# Patient Record
Sex: FEMALE | Race: BLACK OR AFRICAN AMERICAN | NOT HISPANIC OR LATINO | Employment: OTHER
[De-identification: names, ages, dates, MRNs, and addresses within clinical notes are randomized per-mention and may not be internally consistent; named-entity substitution may affect disease eponyms.]

---

## 2023-10-22 ENCOUNTER — HEALTH MAINTENANCE LETTER (OUTPATIENT)
Age: 73
End: 2023-10-22

## 2023-12-01 ASSESSMENT — SLEEP AND FATIGUE QUESTIONNAIRES
HOW LIKELY ARE YOU TO NOD OFF OR FALL ASLEEP IN A CAR, WHILE STOPPED FOR A FEW MINUTES IN TRAFFIC: WOULD NEVER DOZE
HOW LIKELY ARE YOU TO NOD OFF OR FALL ASLEEP WHILE SITTING INACTIVE IN A PUBLIC PLACE: WOULD NEVER DOZE
HOW LIKELY ARE YOU TO NOD OFF OR FALL ASLEEP WHILE LYING DOWN TO REST IN THE AFTERNOON WHEN CIRCUMSTANCES PERMIT: MODERATE CHANCE OF DOZING
HOW LIKELY ARE YOU TO NOD OFF OR FALL ASLEEP WHILE WATCHING TV: SLIGHT CHANCE OF DOZING
HOW LIKELY ARE YOU TO NOD OFF OR FALL ASLEEP WHEN YOU ARE A PASSENGER IN A CAR FOR AN HOUR WITHOUT A BREAK: SLIGHT CHANCE OF DOZING
HOW LIKELY ARE YOU TO NOD OFF OR FALL ASLEEP WHILE SITTING AND READING: SLIGHT CHANCE OF DOZING
HOW LIKELY ARE YOU TO NOD OFF OR FALL ASLEEP WHILE SITTING AND TALKING TO SOMEONE: WOULD NEVER DOZE
HOW LIKELY ARE YOU TO NOD OFF OR FALL ASLEEP WHILE SITTING QUIETLY AFTER LUNCH WITHOUT ALCOHOL: WOULD NEVER DOZE

## 2023-12-01 NOTE — PROGRESS NOTES
Patient declined visit due to insurance coverage.  Roberto Padgett RD, LD  [facility], [place]

## 2023-12-01 NOTE — PROGRESS NOTES
"New Medical Weight Management Consult    PATIENT:  Huyen Sarabia   MRN:         [id number]   :         1950  SILVIA:         2023      Dear Physician No Ref-Primary,    I had the pleasure of seeing your patient, Huyen Sarabia. Full intake/assessment was done to determine barriers to weight loss success and develop a treatment plan. Huyen Sarabia is a 73 year old female interested in treatment of medical problems associated with excess weight. She has a height of 5' 8\", a weight of 268 lbs 0 oz, and the calculated Body mass index is 40.75 kg/m .    Assessment & Plan   Problem List Items Addressed This Visit       Hypertension     Discussed in clinic visit. Anticipate improvement with weight loss.           Relevant Medications    lisinopril-hydrochlorothiazide (ZESTORETIC) 20-25 MG tablet    Prediabetes     Discussed in clinic visit. Anticipate improvement with weight loss.           Class 3 severe obesity due to excess calories with serious comorbidity and body mass index (BMI) of 40.0 to 44.9 in adult (H) - Primary     Initial MWM. Added vitamin D and recheck hgA1c to baseline labs from this summer. Dietitian not covered. Pt reports having multiple secondary insurances so may have GLP-1a coverage. GLP-1a first choice, metformin discussed as back up to address preDM. Also referred to MTM pharmD in 1-2 months for med/dose check. Will wait to send scripts once seeing newest labs in case crosses into diabetes from preDM for insurance coverage/availability.         Relevant Orders    Adult Sleep Eval & Management Referral    Hemoglobin A1c [LAB90] (Future)    Vitamin D Deficiency [NKH153] (Future)    Med Therapy Management Referral     Other Visit Diagnoses       Snoring        Relevant Orders    Adult Sleep Eval & Management Referral             PROGRAM OVERVIEW  Reviewed options at [facility].   All questions were answered. Education provided on chronic disease management of " "obesity.    SURGICAL WEIGHT LOSS   Option presented given pt BMI and current comorbid conditions. No current interest.  Website for bariatric online information session provided.  Pt will review at home and we will discuss at our follow up visit. [email or web address]     MEDICATIONS:  We discussed healthy habits to assist with weight loss. We reviewed medications associated with weight gain. We discussed the role of pharmacological agents in the treatment of obesity and the \"off-label\" use of medications in this practice. We reviewed medication that may assist with weight loss. Indications, contraindications, risks/benefits, and potential side effects were discussed.       GLP-1a was discussed. Discussed mechanism of action, common side effects, titration guidelines, and monitoring for pancreatitis/gallbladder problems/low sugars/MTC/MEN2. Plan to try for GLP-1a after labs with Metfomrin discussed in detail as back up option.    Discussed that medications must always be used together with lifestyle changes such as improvements in diet choices, portion control and establishing and maintaining a regular exercise program. Reviewed rationale for long term use of pharmacotherapy in chronic disease management for obesity.      AOM Considerations:  Phentermine:  Avoid w/age  Topiramate: Caution with age, category D with baby ASA and tramodol, CNS depression w/topiramate, diuretic consdieration with zestoretic  GLP-1: monitor sugars/synthroid response - plan A to try for pending labs   Naltrexone: unable to use w/tramadol    Wellbutrin: monitor response to cymbalta (prescribed by neurology) and tramadol  Metformin: monitor labs/hydration with zestoretic, monitor sugars, plan B - back up option   Contrave: avoid d/t tramadol  Qsymia: Caution with age                8/17/23 eGFR 61, Cr 0.97 WNL    PATIENT INSTRUCTIONS:  Plan:  Labs ordered.  Please call [phone] to set up " "a lab appt.   We will try for either an injectable medication or Metformin once labs are back. We will connect on [employer] once I see labs about prescriptions.  We will repeat blood work in a couple months after starting medications.  Referred to Sleep clinic and Medication Therapy pharmacist.     Goals:  I recommend eating breakfast within an hour of waking up and eating every 4-6 hours during the day to keep your metabolism up. Prioritizing veggies and proteins for food choices is also recommended as medically appropriate.  Recommend 64oz water daily as medically appropriate (6-8 glasses)  Try to cut out sugary drinks   Goal: doing exercises with your  three times a week.     FOLLOW-UP:    Please call [phone] to schedule your next visit in 3 months.    55 minutes spent on the date of the encounter doing chart review, history and exam, review test results, counseling, developing plan of care, documentation, and further activities as noted above.      She has the following co-morbidities:        12/1/2023     2:15 PM   --   I have the following health issues associated with obesity High Blood Pressure    High Cholesterol   I have the following symptoms associated with obesity Knee Pain    Lower Extremity Swelling    Back Pain    Hip Pain     Needs to lose weight as main goal - primary care discussed doing an injection but referred to our clinic due to other medical conditions to consider.        12/1/2023     2:15 PM   Referring Provider   Please name the provider who referred you to Medical Weight Management  If you do not know, please answer \"I Don't Know\" Dr. Monaco           12/1/2023     2:15 PM   Weight History   How concerned are you about your weight? Very Concerned   I became overweight As an Adult   The following factors have contributed to my weight gain Change in Schedule    A Health Crisis    Eating Wrong Types of Food    Eating Too Much    Lack of Exercise    Genetic (Runs in the Family) "   I have tried the following methods to lose weight Watching Portions or Calories    Weight Watchers    Slim Fast or Other Liquid Diets   My lowest weight since age 17 was 178   My highest weight since age 17 was 302   The most weight I have ever lost was (lbs) 35   I have the following family history of obesity/being overweight My mother is overweight    One or more of my siblings are overweight    Many of my relatives are overweight   How has your weight changed over the last year? Gained   How many pounds? 20           12/1/2023     2:15 PM   Diet Recall Review with Patient   If you do eat breakfast, what types of food do you eat? Eggs. Bender. Spinach, crackers   If you do eat lunch, what types of food do you typically eat? Salad, sandwich, starch   If you do eat supper, what types of food do you typically eat? Poultry, veggies, carbs   If you do snack, what types of food do you typically eat? Chips. Popcorn. Ice cream, fruit   How many glasses of juice do you drink in a typical day? 0   How many of glasses of milk do you drink in a typical day? 0   How many 8oz glasses of sugar containing drinks such as [employer]-A[employer]/sweet tea do you drink in a day? 1   How many cans/bottles of sugar pop/soda/tea/sports drinks do you drink in a day? 0   How many cans/bottles of diet pop/soda/tea or sports drink do you drink in a day? 0   How often do you have a drink of alcohol? Never     Meals:  Typically eats three meals but sometimes skips meals but not often.     Tends to be a snacker - more a habit. Also eats short/quick meals due to caring for .     Water: 3-4 glasses a day    Discussed sugary drink - sweet tea person        12/1/2023     2:15 PM   Eating Habits   Generally, my meals include foods like these bread, pasta, rice, potatoes, corn, crackers, sweet dessert, pop, or juice Almost Everyday   Generally, my meals include foods like these fried meats, brats, burgers, french fries, pizza, cheese, chips, or ice cream  A Few Times a Week   Eat fast food (like McDonalds, [employer], Taco Bell) Once a Week   Eat at a buffet or sit-down restaurant Less Than Weekly   Eat most of my meals in front of the TV or computer Almost Everyday   Often skip meals, eat at random times, have no regular eating times Almost Everyday   Rarely sit down for a meal but snack or graze throughout Almost Everyday   Eat extra snacks between meals Almost Everyday   Eat most of my food at the end of the day A Few Times a Week   Eat in the middle of the night or wake up at night to eat Once a Week   Eat extra snacks to prevent or correct low blood sugar Never   Eat to prevent acid reflux or stomach pain Less Than Weekly   Worry about not having enough food to eat Never   I eat when I am depressed Less Than Weekly   I eat when I am stressed Less Than Weekly   I eat when I am bored A Few Times a Week   I eat when I am anxious Once a Week   I eat when I am happy or as a reward Less Than Weekly   I feel hungry all the time even if I just have eaten Never   Feeling full is important to me Never   I finish all the food on my plate even if I am already full Almost Everyday   I can't resist eating delicious food or walk past the good food/smell Once a Week   I eat/snack without noticing that I am eating Less Than Weekly   I eat when I am preparing the meal Once a Week   I eat more than usual when I see others eating Once a Week   I have trouble not eating sweets, ice cream, cookies, or chips if they are around the house Almost Everyday   I think about food all day Less Than Weekly   What foods, if any, do you crave? Sweets/Candy/Chocolate           12/1/2023     2:15 PM   Amount of Food   I feel out of control when eating Never   I eat a large amount of food, like a loaf of bread, a box of cookies, a pint/quart of ice cream, all at once Never   I eat a large amount of food even when I am not hungry Monthly   I eat rapidly Monthly   I eat alone because I feel  embarrassed and do not want others to see how much I have eaten Never   I eat until I am uncomfortably full Never   I feel bad, disgusted, or guilty after I overeat Monthly      Denies intentionally vomiting after eating/using diuretics/laxatives or other purging type activities            12/1/2023     2:15 PM   Activity/Exercise History   How much of a typical 12 hour day do you spend sitting? Most of the Day   How much of a typical 12 hour day do you spend lying down? Less Than Half the Day   How much of a typical day do you spend walking/standing? Less Than Half the Day   How many hours (not including work) do you spend on the TV/Video Games/Computer/Tablet/Phone? 2-3 Hours   How many times a week are you active for the purpose of exercise? Never   What keeps you from being more active? Pain   How many total minutes do you spend doing some activity for the purpose of exercising when you exercise? 15-30 Minutes     Get moving referral?  Knows exercises she could do and will do exercises with .    PAST MEDICAL HISTORY:  Past Medical History:   Diagnosis Date    Arthritis 2002    Hypertension 2000    Thyroid disease 2002 12/1/2023     2:15 PM   Work/Social History Reviewed With Patient   My employment status is Retired    Disabled   What is your marital status? /In a Relationship   If in a relationship, is your significant other overweight? No   If you have children, are they overweight? Yes   Who do you live with? My  and daughter   Who does the food shopping? Me and my daughter       Social History     Tobacco Use    Smoking status: Never    Smokeless tobacco: Never   Substance Use Topics    Alcohol use: Never    Drug use: Never            12/1/2023     2:15 PM   Mental Health History Reviewed With Patient   Have you ever been physically or sexually abused? No   How often in the past 2 weeks have you felt little interest or pleasure in doing things? Not at all   Over the past 2  weeks how often have you felt down, depressed, or hopeless? Not at all           12/1/2023     2:15 PM   Questions Reviewed With Patient   How ready are you to make changes regarding your weight? Number 1 = Not ready at all to make changes up to 10 = very ready. 1   How confident are you that you can change? 1 = Not confident that you will be successful making changes up to 10 = very confident. 3     Actually is at a 10 for ready to make changes.         12/1/2023     2:15 PM   Sleep History Reviewed With Patient   How many hours do you sleep at night? 5     Do you think that you snore loudly or has anybody ever heard you snore loudly (louder than talking or so loud it can be heard behind a shut door)? Yes   Has anyone seen or heard you stop breathing during your sleep? No   Do you often feel tired, fatigued, or sleepy during the day? No   Do you have a TV/Computer in your bedroom? Yes     Stop bang score is 6 - referred to sleep clinic    MEDICATIONS:   Current Outpatient Medications   Medication Sig Dispense Refill    atorvastatin (LIPITOR) 40 MG tablet Take 40 mg by mouth daily      azaTHIOprine (IMURAN) 50 MG tablet Take 1 tablet by mouth 2 times daily      DULoxetine (CYMBALTA) 60 MG capsule Take 1 capsule by mouth daily      gabapentin (NEURONTIN) 300 MG capsule Take 900 mg by mouth      levothyroxine (SYNTHROID/LEVOTHROID) 100 MCG tablet Take 1 tablet by mouth daily      lisinopril-hydrochlorothiazide (ZESTORETIC) 20-25 MG tablet Take 1 tablet by mouth daily      medical cannabis (Patient's own supply) See Admin Instructions (The purpose of this order is to document that the patient reports taking medical cannabis.  This is not a prescription, and is not used to certify that the patient has a qualifying medical condition.)      traMADol (ULTRAM) 50 MG tablet Take 50 mg by mouth every 6 hours as needed for severe pain      aspirin (ASA) 81 MG chewable tablet Take 81 mg by mouth      calcium carbonate (OS-CRYSTAL)  "1500 (600 Ca) MG tablet Take 1 tablet by mouth daily      Vitamin D3 (VITAMIN D-1000 MAX ST) 25 mcg (1000 units) tablet Take 25 mcg by mouth       Noticed most weight gain with prednisone for a long time with back pain    ALLERGIES:   No Known Allergies    ROS:    HEENT  H/O glaucoma:  no  Cardiovascular  CAD:   no  Palpitations:   no  HTN:    yes  Gastrointestinal  GERD:   Yes - not regularly  Constipation:   No  Gastroparesis:  No  Liver Dz:   no  H/O Pancreatitis:  no  H/O Gallbladder Dz: removed  Psychiatric  Moods Stable:  yes  Anxiety:   no  Depression:  no  Bipolar:  no  H/O ETOH/Drug Use: no  H/O eating disorder: no  Endocrine  PMH/FMH of MTC or MEN2:  no  Neurologic:  H/O seizures:   no  Headaches:  no  Memory Impairment:  no    H/O kidney stones:  no  Kidney disease:  no  Current birth control:  Post-menopausal    LABS/RECORDS REVIEWED:  No results found for: \"A1C\", \"VITDT\", \"TSH\", \"NA\", \"POTASSIUM\", \"CHLORIDE\", \"CO2\", \"ANIONGAP\", \"GLC\", \"BUN\", \"CR\", \"GFRESTIMATED\", \"CRYSTAL\", \"BILITOTAL\", \"ALKPHOS\", \"ALT\", \"AST\", \"CHOL\", \"HDL\", \"LDL\", \"TRIG\", \"WBC\", \"HGB\", \"HCT\", \"MCV\", \"PLT\"      10/6/23:  LFTs WNL  8/17/23 CMP WNL  TSH WNL  hgA1c - preDM at 6.1      BP Readings from Last 6 Encounters:   12/04/23 139/81       Pulse Readings from Last 6 Encounters:   12/04/23 80       PHYSICAL EXAM:  /81   Pulse 80   Ht 5' 8\" (1.727 m)   Wt 268 lb (121.6 kg)   SpO2 100%   BMI 40.75 kg/m    GENERAL: Healthy, alert and no distress  EYES: Eyes grossly normal to inspection.    RESP: No audible wheeze, cough, or visible cyanosis.  No increased work of breathing. Lungs clear to auscultation  Heart: regular rate and rhythm. No significant murmurs, rubs, or gallops  SKIN: Visible skin clear.   NEURO: Mentation and speech appropriate for age.  PSYCH: Mentation appears normal, affect normal/bright, judgement and insight intact, normal speech and appearance well-groomed.      COUNSELING:   Reviewed obesity as a chronic " disease and comprehensive management stratagies.      We discussed Bariatric Basics including:  -eating 3 meals daily  -eating protein first    Weight Management Tips Handout given in AVS      We discussed the importance of restorative sleep and stress management in maintaining a healthy weight.  We discussed insulin resistance and glycemic index as it relates to appetite and weight control.   We discussed the importance of physical activity including cardiovascular and strength training in maintaining a healthier weight and explored viable options.  Patient education of above written in AVS.      Sincerely,    Makayla Forbes PA-C

## 2023-12-04 ENCOUNTER — ALLIED HEALTH/NURSE VISIT (OUTPATIENT)
Dept: SURGERY | Facility: CLINIC | Age: 73
End: 2023-12-04
Payer: MEDICARE

## 2023-12-04 ENCOUNTER — OFFICE VISIT (OUTPATIENT)
Dept: SURGERY | Facility: CLINIC | Age: 73
End: 2023-12-04
Payer: MEDICARE

## 2023-12-04 VITALS
BODY MASS INDEX: 40.62 KG/M2 | DIASTOLIC BLOOD PRESSURE: 81 MMHG | OXYGEN SATURATION: 100 % | SYSTOLIC BLOOD PRESSURE: 139 MMHG | HEART RATE: 80 BPM | HEIGHT: 68 IN | WEIGHT: 268 LBS

## 2023-12-04 DIAGNOSIS — I10 HYPERTENSION, UNSPECIFIED TYPE: ICD-10-CM

## 2023-12-04 DIAGNOSIS — R06.83 SNORING: ICD-10-CM

## 2023-12-04 DIAGNOSIS — E66.813 CLASS 3 SEVERE OBESITY DUE TO EXCESS CALORIES WITH SERIOUS COMORBIDITY AND BODY MASS INDEX (BMI) OF 40.0 TO 44.9 IN ADULT (H): Primary | ICD-10-CM

## 2023-12-04 DIAGNOSIS — E66.01 CLASS 3 SEVERE OBESITY DUE TO EXCESS CALORIES WITH SERIOUS COMORBIDITY AND BODY MASS INDEX (BMI) OF 40.0 TO 44.9 IN ADULT (H): Primary | ICD-10-CM

## 2023-12-04 DIAGNOSIS — R73.03 PREDIABETES: ICD-10-CM

## 2023-12-04 PROBLEM — Z91.81 AT RISK FOR FALLS: Status: ACTIVE | Noted: 2022-08-04

## 2023-12-04 PROBLEM — G04.91 MYELITIS (H): Status: ACTIVE | Noted: 2019-04-18

## 2023-12-04 PROCEDURE — 99204 OFFICE O/P NEW MOD 45 MIN: CPT | Performed by: PHYSICIAN ASSISTANT

## 2023-12-04 RX ORDER — AZATHIOPRINE 50 MG/1
1 TABLET ORAL 2 TIMES DAILY
COMMUNITY
Start: 2023-07-17

## 2023-12-04 RX ORDER — VITAMIN B COMPLEX
25 TABLET ORAL
COMMUNITY

## 2023-12-04 RX ORDER — DULOXETIN HYDROCHLORIDE 60 MG/1
1 CAPSULE, DELAYED RELEASE ORAL DAILY
COMMUNITY
Start: 2023-07-19

## 2023-12-04 RX ORDER — BENAZEPRIL/HYDROCHLOROTHIAZIDE 20 MG-25MG
1 TABLET ORAL DAILY
COMMUNITY
End: 2023-12-04

## 2023-12-04 RX ORDER — LISINOPRIL AND HYDROCHLOROTHIAZIDE 20; 25 MG/1; MG/1
1 TABLET ORAL DAILY
COMMUNITY
Start: 2023-07-18

## 2023-12-04 RX ORDER — ASPIRIN 81 MG/1
81 TABLET, CHEWABLE ORAL
COMMUNITY

## 2023-12-04 RX ORDER — TRAMADOL HYDROCHLORIDE 50 MG/1
50 TABLET ORAL EVERY 6 HOURS PRN
COMMUNITY

## 2023-12-04 RX ORDER — GABAPENTIN 300 MG/1
900 CAPSULE ORAL
COMMUNITY
Start: 2023-08-02

## 2023-12-04 RX ORDER — ATORVASTATIN CALCIUM 40 MG/1
40 TABLET, FILM COATED ORAL DAILY
COMMUNITY
Start: 2023-08-25

## 2023-12-04 RX ORDER — LEVOTHYROXINE SODIUM 100 UG/1
1 TABLET ORAL DAILY
COMMUNITY
Start: 2023-09-14

## 2023-12-04 NOTE — ASSESSMENT & PLAN NOTE
Initial MWM. Added vitamin D and recheck hgA1c to baseline labs from this summer. Dietitian not covered. Pt reports having multiple secondary insurances so may have GLP-1a coverage. GLP-1a first choice, metformin discussed as back up to address preDM. Also referred to MTM pharmD in 1-2 months for med/dose check. Will wait to send scripts once seeing newest labs in case crosses into diabetes from preDM for insurance coverage/availability.

## 2023-12-04 NOTE — PATIENT INSTRUCTIONS
"To ensure quality you may receive a patient satisfaction survey. The greatest compliment you can give is \"Likely to Recommend.\"    Nice to talk with you today.  Thank you for your trust. Below is the plan discussed.-  Makayla Forbes PA-C     Plan:  Labs ordered.  Please call [phone] to set up a lab appt.   We will try for either an injectable medication or Metformin once labs are back. We will connect on [employer] once I see labs about prescriptions.  We will repeat blood work in a couple months after starting medications.  Referred to Sleep clinic and Medication Therapy pharmacist.     Goals:  I recommend eating breakfast within an hour of waking up and eating every 4-6 hours during the day to keep your metabolism up. Prioritizing veggies and proteins for food choices is also recommended as medically appropriate.  Recommend 64oz water daily as medically appropriate (6-8 glasses)  Try to cut out sugary drinks   Goal: doing exercises with your  three times a week.     FOLLOW-UP:    Please call [phone] to schedule your next visit in 3 months.                               "

## 2023-12-06 ENCOUNTER — TELEPHONE (OUTPATIENT)
Dept: SURGERY | Facility: CLINIC | Age: 73
End: 2023-12-06

## 2023-12-06 NOTE — TELEPHONE ENCOUNTER
MTM referral from: [facility] visit (referral by provider)    MTM referral outreach attempt #1 on December 6, 2023 at 12:50 PM      Outcome: Patient is not interested at this time because mtm visit is not covered by her insurance, will route to MTM Pharmacist/Provider as an FYI. Thank you for the referral.     Use private pay gfe needed for the carrier/Plan on the flowsheet          Lolis Hope CPhT  MTM

## 2023-12-07 DIAGNOSIS — R73.03 PREDIABETES: ICD-10-CM

## 2023-12-07 DIAGNOSIS — E66.01 CLASS 3 SEVERE OBESITY DUE TO EXCESS CALORIES WITH SERIOUS COMORBIDITY AND BODY MASS INDEX (BMI) OF 40.0 TO 44.9 IN ADULT (H): Primary | ICD-10-CM

## 2023-12-07 DIAGNOSIS — E66.813 CLASS 3 SEVERE OBESITY DUE TO EXCESS CALORIES WITH SERIOUS COMORBIDITY AND BODY MASS INDEX (BMI) OF 40.0 TO 44.9 IN ADULT (H): Primary | ICD-10-CM

## 2023-12-07 RX ORDER — SEMAGLUTIDE 0.25 MG/.5ML
0.25 INJECTION, SOLUTION SUBCUTANEOUS WEEKLY
Qty: 2 ML | Refills: 0 | Status: SHIPPED | OUTPATIENT
Start: 2023-12-07 | End: 2024-01-25

## 2023-12-07 RX ORDER — SEMAGLUTIDE 1 MG/.5ML
1 INJECTION, SOLUTION SUBCUTANEOUS WEEKLY
Qty: 2 ML | Refills: 1 | Status: SHIPPED | OUTPATIENT
Start: 2023-12-07 | End: 2024-01-25

## 2023-12-07 RX ORDER — SEMAGLUTIDE 0.5 MG/.5ML
0.5 INJECTION, SOLUTION SUBCUTANEOUS WEEKLY
Qty: 2 ML | Refills: 1 | Status: SHIPPED | OUTPATIENT
Start: 2023-12-07 | End: 2024-01-25

## 2024-01-25 DIAGNOSIS — E66.813 CLASS 3 SEVERE OBESITY DUE TO EXCESS CALORIES WITH SERIOUS COMORBIDITY AND BODY MASS INDEX (BMI) OF 40.0 TO 44.9 IN ADULT (H): Primary | ICD-10-CM

## 2024-01-25 DIAGNOSIS — R73.03 PREDIABETES: ICD-10-CM

## 2024-01-25 DIAGNOSIS — E66.01 CLASS 3 SEVERE OBESITY DUE TO EXCESS CALORIES WITH SERIOUS COMORBIDITY AND BODY MASS INDEX (BMI) OF 40.0 TO 44.9 IN ADULT (H): Primary | ICD-10-CM

## 2024-01-25 RX ORDER — METFORMIN HCL 500 MG
TABLET, EXTENDED RELEASE 24 HR ORAL
Qty: 60 TABLET | Refills: 2 | Status: SHIPPED | OUTPATIENT
Start: 2024-01-25

## 2024-05-23 ENCOUNTER — TELEPHONE (OUTPATIENT)
Dept: MAMMOGRAPHY | Facility: CLINIC | Age: 74
End: 2024-05-23
Payer: MEDICARE

## 2024-05-23 NOTE — TELEPHONE ENCOUNTER
Patient got transferred multiple times to schedule a mammogram.  Patient was transferred from the ER to Radiology in [place] to schedule. The patient wanted appt scheduled in [place].  I gave her the scheduling number.  Patient was going to call and schedule appointment.

## 2024-10-06 ENCOUNTER — HEALTH MAINTENANCE LETTER (OUTPATIENT)
Age: 74
End: 2024-10-06